# Patient Record
Sex: MALE | Race: ASIAN | Employment: FULL TIME | URBAN - METROPOLITAN AREA
[De-identification: names, ages, dates, MRNs, and addresses within clinical notes are randomized per-mention and may not be internally consistent; named-entity substitution may affect disease eponyms.]

---

## 2017-04-25 ENCOUNTER — TELEPHONE (OUTPATIENT)
Dept: INTERNAL MEDICINE CLINIC | Age: 22
End: 2017-04-25

## 2017-04-25 DIAGNOSIS — J45.909 UNCOMPLICATED ASTHMA, UNSPECIFIED ASTHMA SEVERITY: Primary | ICD-10-CM

## 2017-07-03 ENCOUNTER — OFFICE VISIT (OUTPATIENT)
Dept: INTERNAL MEDICINE CLINIC | Age: 22
End: 2017-07-03

## 2017-07-03 ENCOUNTER — HOSPITAL ENCOUNTER (OUTPATIENT)
Dept: OTHER | Age: 22
Discharge: HOME | End: 2017-07-03
Attending: INTERNAL MEDICINE | Admitting: INTERNAL MEDICINE

## 2017-07-03 VITALS
OXYGEN SATURATION: 90 % | WEIGHT: 118 LBS | SYSTOLIC BLOOD PRESSURE: 102 MMHG | BODY MASS INDEX: 15.98 KG/M2 | DIASTOLIC BLOOD PRESSURE: 78 MMHG | HEART RATE: 96 BPM | RESPIRATION RATE: 16 BRPM | HEIGHT: 72 IN

## 2017-07-03 DIAGNOSIS — R06.2 WHEEZING: Primary | ICD-10-CM

## 2017-07-03 DIAGNOSIS — R06.2 WHEEZING: ICD-10-CM

## 2017-07-03 PROCEDURE — 94640 AIRWAY INHALATION TREATMENT: CPT | Performed by: INTERNAL MEDICINE

## 2017-07-03 PROCEDURE — 99213 OFFICE O/P EST LOW 20 MIN: CPT | Performed by: INTERNAL MEDICINE

## 2017-07-03 RX ORDER — PREDNISONE 20 MG/1
TABLET ORAL
Qty: 15 TABLET | Refills: 0 | Status: SHIPPED | OUTPATIENT
Start: 2017-07-03 | End: 2018-01-18 | Stop reason: ALTCHOICE

## 2017-07-03 RX ORDER — AZITHROMYCIN 250 MG/1
TABLET, FILM COATED ORAL
Qty: 1 PACKET | Refills: 0 | Status: SHIPPED | OUTPATIENT
Start: 2017-07-03 | End: 2017-07-13

## 2017-07-03 RX ORDER — ALBUTEROL SULFATE 2.5 MG/3ML
2.5 SOLUTION RESPIRATORY (INHALATION) ONCE
Status: COMPLETED | OUTPATIENT
Start: 2017-07-03 | End: 2017-07-03

## 2017-07-03 RX ADMIN — ALBUTEROL SULFATE 2.5 MG: 2.5 SOLUTION RESPIRATORY (INHALATION) at 10:28

## 2017-07-03 ASSESSMENT — ENCOUNTER SYMPTOMS
COUGH: 1
SINUS PRESSURE: 0
RHINORRHEA: 0
SHORTNESS OF BREATH: 1

## 2017-07-06 ENCOUNTER — TELEPHONE (OUTPATIENT)
Dept: INTERNAL MEDICINE CLINIC | Age: 22
End: 2017-07-06

## 2017-07-10 ENCOUNTER — TELEPHONE (OUTPATIENT)
Dept: INTERNAL MEDICINE CLINIC | Age: 22
End: 2017-07-10

## 2017-07-10 DIAGNOSIS — J45.909 UNCOMPLICATED ASTHMA, UNSPECIFIED ASTHMA SEVERITY: ICD-10-CM

## 2017-07-10 RX ORDER — ALBUTEROL SULFATE 90 UG/1
2 AEROSOL, METERED RESPIRATORY (INHALATION) EVERY 6 HOURS PRN
Qty: 1 INHALER | Refills: 5 | Status: SHIPPED | OUTPATIENT
Start: 2017-07-10 | End: 2018-01-19 | Stop reason: ALTCHOICE

## 2017-07-31 ENCOUNTER — TELEPHONE (OUTPATIENT)
Dept: PHARMACY | Facility: CLINIC | Age: 22
End: 2017-07-31

## 2017-11-27 ENCOUNTER — OFFICE VISIT (OUTPATIENT)
Dept: URGENT CARE | Age: 22
End: 2017-11-27

## 2017-11-27 VITALS
OXYGEN SATURATION: 97 % | HEIGHT: 72 IN | HEART RATE: 70 BPM | WEIGHT: 135 LBS | SYSTOLIC BLOOD PRESSURE: 116 MMHG | TEMPERATURE: 97.1 F | DIASTOLIC BLOOD PRESSURE: 69 MMHG | BODY MASS INDEX: 18.28 KG/M2

## 2017-11-27 DIAGNOSIS — J06.9 VIRAL UPPER RESPIRATORY TRACT INFECTION: Primary | ICD-10-CM

## 2017-11-27 PROCEDURE — 99202 OFFICE O/P NEW SF 15 MIN: CPT | Performed by: PHYSICIAN ASSISTANT

## 2017-11-27 ASSESSMENT — ENCOUNTER SYMPTOMS
SPUTUM PRODUCTION: 0
WHEEZING: 0
SORE THROAT: 0
SINUS PAIN: 0
COUGH: 0
NAUSEA: 0
VOMITING: 0
DIARRHEA: 0
STRIDOR: 0
ABDOMINAL PAIN: 0
SHORTNESS OF BREATH: 0

## 2017-11-27 NOTE — PROGRESS NOTES
Reason for Visit:   Chief Complaint   Patient presents with    Head Congestion     Onset:  one week; nasal congestion, coughing, no fever     Patient History     HPI: Ashlie Glover is a 25 y.o. male who presents with nasal congestion/runny nose for one week. He denies any fever, sore throat, ear pain, chest congestion, cough, or wheeze. He has a hx of environmental allergies (not currently taking any meds for this) and asthma. Past Medical History:   Diagnosis Date    Anaphylactic reaction     due to nut allergy    Asthma        History reviewed. No pertinent surgical history. Allergies: Allergies   Allergen Reactions    Peanut-Containing Drug Products      anaphylaxis    Tree Nut [Macadamia Nut Oil]        Review of Systems     ROS: Please refer to HPI for any pertinent positive findings, as all other systems were reviewed as negative unless otherwise listed above. Review of Systems   Constitutional: Negative for chills, diaphoresis, fever and malaise/fatigue. HENT: Positive for congestion. Negative for ear pain, sinus pain and sore throat. Respiratory: Negative for cough, sputum production, shortness of breath, wheezing and stridor. Gastrointestinal: Negative for abdominal pain, diarrhea, nausea and vomiting. Musculoskeletal: Negative for myalgias. Skin: Negative for rash. Endo/Heme/Allergies: Positive for environmental allergies. Physical Exam     Vitals:    11/27/17 1024   BP: 116/69   Pulse: 70   Temp: 97.1 °F (36.2 °C)   SpO2: 97%       Constitutional:  Well developed, well nourished, no acute distress, non-toxic appearance   Eyes:  PERRL, conjunctiva normal, no ciliary injection, evidence of foreign body, or discharge. HENT:  Head is normocephalic, atraumatic; external ears and TMs normal bilaterally, nasal mucosa congestion with clear drainage, oropharynx pink and moist, no pharyngeal exudates.  Neck- Supple, passive and active range of motion in tact, no tenderness upon palpation along spine. No LAD  or neck masses appreciated. Respiratory:  No respiratory distress, normal breath sounds bilaterally, no rales, no wheezing. Cardiovascular:  Normal rate, normal rhythm, no murmurs, no gallops, no rubs   GI:  Abdomen soft, nontender, nondistended, normal bowel sounds, no organomegaly, no mass, no rebound, no guarding. Musculoskeletal:  No pain upon palpation along spine or musculature. No edema, no tenderness, no deformities. Integument:  Well hydrated, no lesions, cyanosis, or rashes appreciated. Lymphatic:  No lymphadenopathy noted   Neurologic:  Alert & oriented x 3, CN 2-12 in tact bilaterally, normal motor function and sensation in tact, no focal deficits noted   Psychiatric:  Speech and behavior appropriate. Appropriate mood and affect. Physical Exam    Procedure:   none    Assessment:    1. Viral upper respiratory tract infection        Plan:    - We have discussed humidification, fluids, and nasal saline. Encouraged Flonase BID for the next 1-2 weeks and/or Sudafed +/- antihistamine. Take Tylenol/Ibuprofen as needed. Follow up with PCP in the next 3-5 days if sxs worsen and/or fever occurs. No orders of the defined types were placed in this encounter.

## 2017-12-20 ENCOUNTER — NURSE TRIAGE (OUTPATIENT)
Dept: OTHER | Facility: CLINIC | Age: 22
End: 2017-12-20

## 2017-12-20 NOTE — TELEPHONE ENCOUNTER
Late Entry for Triage encounter called to [facility]. Please Refer to  for call information and disposition.

## 2018-01-18 ENCOUNTER — OFFICE VISIT (OUTPATIENT)
Dept: ORTHOPEDIC SURGERY | Age: 23
End: 2018-01-18

## 2018-01-18 ENCOUNTER — PAT TELEPHONE (OUTPATIENT)
Dept: PREADMISSION TESTING | Age: 23
End: 2018-01-18

## 2018-01-18 ENCOUNTER — OFFICE VISIT (OUTPATIENT)
Dept: PULMONOLOGY | Age: 23
End: 2018-01-18

## 2018-01-18 VITALS
BODY MASS INDEX: 18.01 KG/M2 | HEART RATE: 86 BPM | HEIGHT: 72 IN | WEIGHT: 133 LBS | RESPIRATION RATE: 16 BRPM | SYSTOLIC BLOOD PRESSURE: 109 MMHG | OXYGEN SATURATION: 91 % | TEMPERATURE: 97.1 F | DIASTOLIC BLOOD PRESSURE: 73 MMHG

## 2018-01-18 VITALS
DIASTOLIC BLOOD PRESSURE: 70 MMHG | HEIGHT: 72 IN | SYSTOLIC BLOOD PRESSURE: 110 MMHG | HEART RATE: 62 BPM | BODY MASS INDEX: 18.01 KG/M2 | WEIGHT: 133 LBS

## 2018-01-18 DIAGNOSIS — D72.10 EOSINOPHILIA: ICD-10-CM

## 2018-01-18 DIAGNOSIS — J45.40 MODERATE PERSISTENT ASTHMA WITHOUT COMPLICATION: ICD-10-CM

## 2018-01-18 DIAGNOSIS — Z23 NEED FOR INFLUENZA VACCINATION: ICD-10-CM

## 2018-01-18 DIAGNOSIS — Z72.0 TOBACCO ABUSE: ICD-10-CM

## 2018-01-18 DIAGNOSIS — S66.821A: Primary | ICD-10-CM

## 2018-01-18 DIAGNOSIS — J45.40 MODERATE PERSISTENT ASTHMA WITHOUT COMPLICATION: Primary | ICD-10-CM

## 2018-01-18 DIAGNOSIS — Z23 NEED FOR VACCINATION FOR STREP PNEUMONIAE: ICD-10-CM

## 2018-01-18 LAB
BASOPHILS ABSOLUTE: 0.3 K/UL (ref 0–0.2)
BASOPHILS RELATIVE PERCENT: 2 %
EOSINOPHILS ABSOLUTE: 2.1 K/UL (ref 0–0.6)
EOSINOPHILS RELATIVE PERCENT: 15 %
HCT VFR BLD CALC: 52.9 % (ref 40.5–52.5)
HEMATOLOGY PATH CONSULT: YES
HEMOGLOBIN: 18 G/DL (ref 13.5–17.5)
LYMPHOCYTES ABSOLUTE: 2 K/UL (ref 1–5.1)
LYMPHOCYTES RELATIVE PERCENT: 14 %
MCH RBC QN AUTO: 33.2 PG (ref 26–34)
MCHC RBC AUTO-ENTMCNC: 33.9 G/DL (ref 31–36)
MCV RBC AUTO: 97.8 FL (ref 80–100)
MONOCYTES ABSOLUTE: 0.7 K/UL (ref 0–1.3)
MONOCYTES RELATIVE PERCENT: 5 %
MYELOCYTE PERCENT: 1 %
NEUTROPHILS ABSOLUTE: 9.2 K/UL (ref 1.7–7.7)
NEUTROPHILS RELATIVE PERCENT: 63 %
PDW BLD-RTO: 13.7 % (ref 12.4–15.4)
PLATELET # BLD: 254 K/UL (ref 135–450)
PLATELET SLIDE REVIEW: ADEQUATE
PMV BLD AUTO: 8 FL (ref 5–10.5)
RBC # BLD: 5.41 M/UL (ref 4.2–5.9)
SLIDE REVIEW: ABNORMAL
WBC # BLD: 14.3 K/UL (ref 4–11)

## 2018-01-18 PROCEDURE — 99243 OFF/OP CNSLTJ NEW/EST LOW 30: CPT | Performed by: ORTHOPAEDIC SURGERY

## 2018-01-18 PROCEDURE — 90472 IMMUNIZATION ADMIN EACH ADD: CPT | Performed by: INTERNAL MEDICINE

## 2018-01-18 PROCEDURE — 90732 PPSV23 VACC 2 YRS+ SUBQ/IM: CPT | Performed by: INTERNAL MEDICINE

## 2018-01-18 PROCEDURE — 99244 OFF/OP CNSLTJ NEW/EST MOD 40: CPT | Performed by: INTERNAL MEDICINE

## 2018-01-18 PROCEDURE — 90471 IMMUNIZATION ADMIN: CPT | Performed by: INTERNAL MEDICINE

## 2018-01-18 PROCEDURE — 90686 IIV4 VACC NO PRSV 0.5 ML IM: CPT | Performed by: INTERNAL MEDICINE

## 2018-01-18 RX ORDER — MONTELUKAST SODIUM 10 MG/1
10 TABLET ORAL NIGHTLY
Qty: 30 TABLET | Refills: 6 | Status: SHIPPED | OUTPATIENT
Start: 2018-01-18 | End: 2020-01-29 | Stop reason: SDUPTHER

## 2018-01-18 RX ORDER — ALBUTEROL SULFATE 90 UG/1
2 AEROSOL, METERED RESPIRATORY (INHALATION) EVERY 4 HOURS PRN
Qty: 1 INHALER | Refills: 11 | Status: SHIPPED | OUTPATIENT
Start: 2018-01-18

## 2018-01-18 NOTE — LETTER
[facility] and [address] [facility]  Phone: [phone]  Fax: [phone]    Geetha Meier DO        January 18, 2018     Patient: Ortiz Leija   YOB: 1995   Date of Visit: 1/18/2018       To Whom it May Concern:    Ortiz Leija was seen in my clinic on 1/18/2018. He may return to school on 1/19/18. If you have any questions or concerns, please don't hesitate to call.     Sincerely,         Geetha Meier DO

## 2018-01-18 NOTE — PROGRESS NOTES
This 25 y.o. right hand dominant  is seen in consultation for Corrina Garza MD with a chief complaint of an injury to the right little finger. The injury occurred 6 days ago when the patient lacerated the finger with a knife. He also had a smaller, more superficial laceration of the adjacent ring finger. He noticed pain, bleeding. The patient was evaluated at the [facility], [place], operated by [facility] DR [facility]LO [facility] 3 days later, where the wounds were cleaned,dressed and splinted  A lacerated tendon was suspected. A lacerated nerve was not suspected. A foreign body was not found. Antibiotics were not prescribed. Tetanus prophylaxis was updated. The patient was sent for hand surgery consultation. The pain assessment has been reviewed and is correct. The patient's social history, past medical history, family history, medications, allergies and review of systems, entered 1/18/18, have been reviewed, and dated and are recorded in the chart. On physical examination the patient is Height: 6' (182.9 cm) tall and weighs Weight: 133 lb (60.3 kg). Respirations are 18 per minute. The patient is well nourished, is oriented to time and place, demonstrates appropriate mood and affect as well as normal gait and station. There is a 1 cm transverse laceration involving the palmar aspect of the right little finger at a level 1 CM distal to the proximal flexor crease. There is absent drainage. There is absent sign of infection. Range of motion of the little finger only is absent in active flexion at the PIP and DIP joints. Distal sensation  is normal.  Distal circulation is intact. There is no deformity. All joints are stable. Deep tendon reflexes are present bilaterally. There is no cellulitis or lymphangitis. There is no proximal adenopathy. There is no sign of additional significant injury to the opposite upper extremity.     Review of outside Xrays of the right hand demonstrate no bone injury or foreign

## 2018-01-18 NOTE — ADDENDUM NOTE
Encounter addended by: Kael Ely MA on: 1/18/2018  2:27 PM<BR>    Actions taken: Letter status changed

## 2018-01-18 NOTE — PROGRESS NOTES
PATIENT IS SEEN AT THE REQUEST OF DR. Radha Louise for pneumonia    Chief complaint  This is a 25y.o. year old male  who comes to see me with a chief complaint of   Chief Complaint   Patient presents with    Pneumonia       HPI  Here with a cc of pneumonia and asthma. Joelle Noble he was at [facility] for pneumonia back in April. Since that time he has improved and feels that his breathing is doing better. He is using Advair bid and albuterol. Rarely has needed to use it lately. Does smoke cigarettes and drink ETOH(heavily) he downplayed those facts because his mother was present in the room. Has known asthma since childhood. Does not know her triggers, but thinks exercise and cold air are some. He is in school    Occupation:  Student, works at [employer]: None    Hobbies/Exposures: None    TB Exposure:  None    Lung Procedures:  Bronchoscopy 4/2017, Intubation 4/2017      Past Medical History:   Diagnosis Date    Anaphylactic reaction     due to nut allergy    Asthma        History reviewed. No pertinent surgical history. Social History     Social History    Marital status: Single     Spouse name: N/A    Number of children: N/A    Years of education: N/A     Occupational History    Not on file.      Social History Main Topics    Smoking status: Never Smoker    Smokeless tobacco: Never Used    Alcohol use 8.4 oz/week     14 Cans of beer per week    Drug use: No    Sexual activity: No     Other Topics Concern    Not on file     Social History Narrative    No narrative on file       Family History   Problem Relation Age of Onset    Heart Disease Father 48         Current Outpatient Prescriptions:     fluticasone-salmeterol (ADVAIR DISKUS) 250-50 MCG/DOSE AEPB, Inhale 1 puff into the lungs every 12 hours, Disp: 60 each, Rfl: 5    albuterol sulfate HFA (PROAIR HFA) 108 (90 Base) MCG/ACT inhaler, Inhale 2 puffs into the lungs every 6 hours as needed for Wheezing, Disp: 1 Inhaler, Rfl: 5    EPINEPHrine

## 2018-01-19 ENCOUNTER — PAT TELEPHONE (OUTPATIENT)
Dept: PREADMISSION TESTING | Age: 23
End: 2018-01-19

## 2018-01-19 ENCOUNTER — OFFICE VISIT (OUTPATIENT)
Dept: INTERNAL MEDICINE CLINIC | Age: 23
End: 2018-01-19

## 2018-01-19 VITALS — WEIGHT: 134 LBS | HEIGHT: 72 IN | BODY MASS INDEX: 18.15 KG/M2

## 2018-01-19 VITALS
TEMPERATURE: 98.4 F | RESPIRATION RATE: 12 BRPM | HEART RATE: 80 BPM | DIASTOLIC BLOOD PRESSURE: 72 MMHG | OXYGEN SATURATION: 91 % | SYSTOLIC BLOOD PRESSURE: 102 MMHG | BODY MASS INDEX: 18.18 KG/M2 | WEIGHT: 134.2 LBS | HEIGHT: 72 IN

## 2018-01-19 DIAGNOSIS — S66.821A: Primary | ICD-10-CM

## 2018-01-19 LAB — HEMATOLOGY PATH CONSULT: NORMAL

## 2018-01-19 PROCEDURE — 99214 OFFICE O/P EST MOD 30 MIN: CPT | Performed by: NURSE PRACTITIONER

## 2018-01-19 ASSESSMENT — ENCOUNTER SYMPTOMS
CHEST TIGHTNESS: 0
BACK PAIN: 0
DIARRHEA: 0
ABDOMINAL PAIN: 0
CONSTIPATION: 0
PHOTOPHOBIA: 0
SHORTNESS OF BREATH: 0
SORE THROAT: 0
COUGH: 0
TROUBLE SWALLOWING: 0
EYE PAIN: 0
WHEEZING: 0
NAUSEA: 0
VOMITING: 0

## 2018-01-19 NOTE — PROGRESS NOTES
wheezes. He has no rales. He exhibits no tenderness. Abdominal: Soft. Bowel sounds are normal. He exhibits no distension and no mass. There is no tenderness. There is no rebound and no guarding. Musculoskeletal: Normal range of motion. He exhibits no edema or tenderness. Lymphadenopathy:     He has no cervical adenopathy. Neurological: He is alert and oriented to person, place, and time. He has normal reflexes. Skin: Skin is warm and dry. Laceration (laceration noted to 4th/5th finger right hand, skin dry and intact, limited ROM, patient denies pain ) noted. No rash noted. No erythema. Psychiatric: He has a normal mood and affect. His behavior is normal.        Assessment/PLAN    There are no diagnoses linked to this encounter. Laceration right little finger w/complete lacerations of both flexor tendons. -patient scheduled to have OP procedure Dr. Vivienne Montalvo 01/23/2018    Asthma, patient reports exercise induced  -recently started on singular, f/w Pulm as Op, no acute distress at this time     GOLDMANS RISK stratification for non-cardiac surgery  - <1%    Patient is able to proceed with medical procedure from PCP standpoint, per surgical recs. Plan:   No problem-specific Assessment & Plan notes found for this encounter. No Follow-up on file.

## 2018-01-19 NOTE — PRE-PROCEDURE INSTRUCTIONS
C-Difficile admission screening and protocol:     * Admitted with diarrhea?no   *Prior history of C-Diff. In last 3 months? no     *Antibiotic use in the past 6-8 weeks? no     *Prior hospitalization or nursing home in the last month?   no

## 2018-01-20 LAB
ALLERGEN ASPERGILLUS ALTERNATA IGE: 19 KU/L
ALLERGEN ASPERGILLUS FUMIGATUS IGE: 0.47 KU/L
ALLERGEN BERMUDA GRASS IGE: 0.56 KU/L
ALLERGEN BIRCH IGE: 0.45 KU/L
ALLERGEN CAT DANDER IGE: 49.6 KU/L
ALLERGEN COMMON SHORT RAGWEED IGE: 0.37 KU/L
ALLERGEN COTTONWOOD: 0.49 KU/L
ALLERGEN COW MILK IGE: 1.1 KU/L
ALLERGEN DOG DANDER IGE: 40.5 KU/L
ALLERGEN ELM IGE: 0.58 KU/L
ALLERGEN FUNGI/MOLD M.RACEMOSUS IGE: 1.92 KU/L
ALLERGEN GERMAN COCKROACH IGE: 4.28 KU/L
ALLERGEN HORMODENDRUM HORDEI IGE: 2.47 KU/L
ALLERGEN MAPLE/BOX ELDER IGE: 1.85 KU/L
ALLERGEN MITE DUST FARINAE IGE: 3.4 KU/L
ALLERGEN MITE DUST PTERONYSSINUS IGE: 3.31 KU/L
ALLERGEN MOUNTAIN CEDAR: 0.68 KU/L
ALLERGEN MOUSE EPITHELIA IGE: 0.41 KU/L
ALLERGEN OAK TREE IGE: 0.46 KU/L
ALLERGEN PEANUT (F13) IGE: >100 KU/L
ALLERGEN PECAN TREE IGE: 0.58 KU/L
ALLERGEN PENICILLIUM NOTATUM: 0.31 KU/L
ALLERGEN ROUGH PIGWEED (W14) IGE: 1.67 KU/L
ALLERGEN RUSSIAN THISTLE IGE: 0.5 KU/L
ALLERGEN SEE NOTE: NORMAL
ALLERGEN SHEEP SORREL (W18) IGE: 0.4 KU/L
ALLERGEN TIMOTHY GRASS: 0.58 KU/L
ALLERGEN TREE SYCAMORE: 0.59 KU/L
ALLERGEN WALNUT TREE IGE: 0.86 KU/L
ALLERGEN WHITE MULBERRY TREE, IGE: 0.41 KU/L
ALLERGEN, TREE, WHITE ASH IGE: 1.63 KU/L
IGE: 6989 KU/L

## 2018-01-23 ENCOUNTER — HOSPITAL ENCOUNTER (OUTPATIENT)
Dept: SURGERY | Age: 23
Discharge: HOME | End: 2018-01-23
Attending: ORTHOPAEDIC SURGERY | Admitting: ORTHOPAEDIC SURGERY

## 2018-01-23 VITALS
DIASTOLIC BLOOD PRESSURE: 88 MMHG | WEIGHT: 127.87 LBS | BODY MASS INDEX: 17.32 KG/M2 | RESPIRATION RATE: 16 BRPM | HEART RATE: 86 BPM | TEMPERATURE: 97.6 F | SYSTOLIC BLOOD PRESSURE: 135 MMHG | OXYGEN SATURATION: 99 % | HEIGHT: 72 IN

## 2018-01-23 DIAGNOSIS — S66.821A: Primary | ICD-10-CM

## 2018-01-23 RX ORDER — ONDANSETRON 2 MG/ML
4 INJECTION INTRAMUSCULAR; INTRAVENOUS
Status: ACTIVE | OUTPATIENT
Start: 2018-01-23 | End: 2018-01-23

## 2018-01-23 RX ORDER — MEPERIDINE HYDROCHLORIDE 25 MG/ML
12.5 INJECTION INTRAMUSCULAR; INTRAVENOUS; SUBCUTANEOUS EVERY 5 MIN PRN
Status: DISCONTINUED | OUTPATIENT
Start: 2018-01-23 | End: 2018-01-24 | Stop reason: HOSPADM

## 2018-01-23 RX ORDER — MORPHINE SULFATE 2 MG/ML
1 INJECTION, SOLUTION INTRAMUSCULAR; INTRAVENOUS EVERY 5 MIN PRN
Status: DISCONTINUED | OUTPATIENT
Start: 2018-01-23 | End: 2018-01-24 | Stop reason: HOSPADM

## 2018-01-23 RX ORDER — HYDROCODONE BITARTRATE AND ACETAMINOPHEN 5; 325 MG/1; MG/1
1 TABLET ORAL EVERY 6 HOURS PRN
Qty: 28 TABLET | Refills: 0 | Status: SHIPPED | OUTPATIENT
Start: 2018-01-23 | End: 2018-01-30

## 2018-01-23 RX ORDER — SODIUM CHLORIDE 9 MG/ML
INJECTION, SOLUTION INTRAVENOUS CONTINUOUS
Status: DISCONTINUED | OUTPATIENT
Start: 2018-01-23 | End: 2018-01-24 | Stop reason: HOSPADM

## 2018-01-23 RX ORDER — OXYCODONE HYDROCHLORIDE 5 MG/1
5 TABLET ORAL PRN
Status: ACTIVE | OUTPATIENT
Start: 2018-01-23 | End: 2018-01-23

## 2018-01-23 RX ORDER — SODIUM CHLORIDE 0.9 % (FLUSH) 0.9 %
10 SYRINGE (ML) INJECTION EVERY 12 HOURS SCHEDULED
Status: DISCONTINUED | OUTPATIENT
Start: 2018-01-23 | End: 2018-01-24 | Stop reason: HOSPADM

## 2018-01-23 RX ORDER — MORPHINE SULFATE 2 MG/ML
2 INJECTION, SOLUTION INTRAMUSCULAR; INTRAVENOUS EVERY 5 MIN PRN
Status: DISCONTINUED | OUTPATIENT
Start: 2018-01-23 | End: 2018-01-24 | Stop reason: HOSPADM

## 2018-01-23 RX ORDER — FENTANYL CITRATE 50 UG/ML
25 INJECTION, SOLUTION INTRAMUSCULAR; INTRAVENOUS EVERY 5 MIN PRN
Status: DISCONTINUED | OUTPATIENT
Start: 2018-01-23 | End: 2018-01-24 | Stop reason: HOSPADM

## 2018-01-23 RX ORDER — FENTANYL CITRATE 50 UG/ML
50 INJECTION, SOLUTION INTRAMUSCULAR; INTRAVENOUS EVERY 5 MIN PRN
Status: DISCONTINUED | OUTPATIENT
Start: 2018-01-23 | End: 2018-01-24 | Stop reason: HOSPADM

## 2018-01-23 RX ORDER — OXYCODONE HYDROCHLORIDE 5 MG/1
10 TABLET ORAL PRN
Status: ACTIVE | OUTPATIENT
Start: 2018-01-23 | End: 2018-01-23

## 2018-01-23 RX ORDER — SODIUM CHLORIDE 0.9 % (FLUSH) 0.9 %
10 SYRINGE (ML) INJECTION PRN
Status: DISCONTINUED | OUTPATIENT
Start: 2018-01-23 | End: 2018-01-24 | Stop reason: HOSPADM

## 2018-01-23 RX ADMIN — SODIUM CHLORIDE: 9 INJECTION, SOLUTION INTRAVENOUS at 07:56

## 2018-01-23 ASSESSMENT — PAIN DESCRIPTION - FREQUENCY: FREQUENCY: CONTINUOUS

## 2018-01-23 ASSESSMENT — PAIN SCALES - GENERAL
PAINLEVEL_OUTOF10: 0
PAINLEVEL_OUTOF10: 0
PAINLEVEL_OUTOF10: 2
PAINLEVEL_OUTOF10: 0

## 2018-01-23 ASSESSMENT — ENCOUNTER SYMPTOMS: SHORTNESS OF BREATH: 0

## 2018-01-23 ASSESSMENT — PAIN DESCRIPTION - PAIN TYPE: TYPE: SURGICAL PAIN

## 2018-01-23 ASSESSMENT — PAIN DESCRIPTION - ONSET: ONSET: GRADUAL

## 2018-01-23 ASSESSMENT — PAIN - FUNCTIONAL ASSESSMENT: PAIN_FUNCTIONAL_ASSESSMENT: 0-10

## 2018-01-23 ASSESSMENT — PAIN DESCRIPTION - DESCRIPTORS: DESCRIPTORS: SORE

## 2018-01-23 ASSESSMENT — PAIN DESCRIPTION - PROGRESSION: CLINICAL_PROGRESSION: GRADUALLY WORSENING

## 2018-01-23 ASSESSMENT — PAIN DESCRIPTION - ORIENTATION: ORIENTATION: RIGHT

## 2018-01-23 ASSESSMENT — PAIN DESCRIPTION - LOCATION: LOCATION: HAND

## 2018-01-23 ASSESSMENT — LIFESTYLE VARIABLES: SMOKING_STATUS: 0

## 2018-01-23 NOTE — OP NOTE
be traumatically lacerated. The underlying flexor tendons were inspected and the Flexor Digitorum Superficialis (FDS) tendon was identified and was found to be partially lacerated, the Flexor Digitorum Profundus (FDP) tendon was identified and was found to be completely lacerated. The Radial Digital Nerve was inspected and found to be not lacerated,  the Ulnar Digital Nerve was inspected and found to be not lacerated. The Small Finger Flexor Digitorum Superficialis (FDS) was able to be retrieved at the level of the level of the laceration and was delivered through the flexor sheath to the level of the traumatic laceration. With distal flexion of the injured digit, the matching ends of the Flexor Digitorum Superficialis (FDS) were able to be approximated. The Flexor Digitorum Superficialis (FDS) was repaired with a 4 core strand repair of 4-0 Fiberwire using a locking Krackow suture technique. The repair was secure without gapping or bunching at the repair site. The finger was able to be extended to a composite 10 degrees without sign of distraction of the repair. The tendons were able to glide through the remaining flexor sheath without impingement or stenosis of the repair site. The wound was thoroughly irrigated and debrided of non-viable tissues. The Small Finger Flexor Digitorum Profundus (FDP) was able to be retrieved at the level of the A1 Pulley and was delivered through the flexor sheath to the level of the traumatic laceration. With distal flexion of the injured digit, the matching ends of the Flexor Digitorum Profundus (FDP) were able to be approximated. The Flexor Digitorum Profundus (FDP) was repaired with a 4 core strand repair of 4-0 Fiberwire using a locking Krackow suture technique. The repair was then oversewn with a running suture of 6-0 nylon. The repair was secure without gapping or bunching at the repair site.   The finger was able to be extended to a composite 10

## 2018-01-23 NOTE — H&P
Pre-operative Update of H&P:    I  have seen & examined Mr. Boothe Prom related solely to his hand and upper extremity conditions, prior to the scheduled procedure on the date of his surgery. The indications for the planned surgical procedure & and his upper-extremity conditionare unchanged. Please see the Anesthesia Pre-Op Note from date of surgery for Mr. Yazan Jones's systemic evaluation.

## 2018-01-23 NOTE — PROGRESS NOTES
To phase 2 via stretcher with RN assist,  Electronically signed by Beverly Cartagena RN on 1/23/2018 at 10:23 AM

## 2018-01-26 NOTE — ADDENDUM NOTE
Encounter addended by: Sebastian Katz MD on: 1/26/2018  8:03 AM<BR>    Actions taken: Letter status changed

## 2018-01-29 ENCOUNTER — OFFICE VISIT (OUTPATIENT)
Dept: ORTHOPEDIC SURGERY | Age: 23
End: 2018-01-29

## 2018-01-29 VITALS — BODY MASS INDEX: 17.2 KG/M2 | HEIGHT: 72 IN | WEIGHT: 127 LBS | RESPIRATION RATE: 15 BRPM

## 2018-01-29 DIAGNOSIS — S66.821A: Primary | ICD-10-CM

## 2018-01-29 PROCEDURE — 99024 POSTOP FOLLOW-UP VISIT: CPT | Performed by: ORTHOPAEDIC SURGERY

## 2018-01-29 NOTE — PATIENT INSTRUCTIONS
Thank you for choosing [facility]) Physicians for your Hand and Upper Extremity needs. If we can be of any further assistance to you, please do not hesitate to contact us.     Office Phone Number:  [phone]  or  [phone]

## 2018-01-29 NOTE — Clinical Note
Dear  Ricci Saint, MD,  Thank you very much for your referral or . Taylor Heller to me for evaluation and treatment of his Hand & Wrist condition. I appreciate your confidence in me and thank you for allowing me the opportunity to care for your patients. If I can be of any further assistance to you on this or any other patient, please do not hesitate to contact me. Sincerely,  Albino Innocent.  Bradley Devlin MD

## 2018-01-29 NOTE — PROGRESS NOTES
Mr. Gallito Tovar returns today in follow-up of his recent right Small Finger Flexor Digitorum Superficialis (FDS) and Flexor Digitorum Profundus (FDP) Tendon Repair which was done 1 week ago. He has noted decreased discomfort and decreased swelling. He has been started in Hand Therapy, is  wearing his protective splint. Physical Exam:  Skin incisions are not able to be assessed due to therapy bandage. Area around the bandage is clean and healthy. Digital range of motion is not assessed due to the presence of the fresh tendon repair. Wrist range of motion is not assessed due to the presence of the fresh tendon repair. Sensation is normal in the Whole Hand. Vascular examination reveals normal, good capillary refill and good color. Swelling is minimal.  Finger posture does suggest integrity of the tendon repair(s)  He does properly position & protect the hand while out of the splint and is  able to properly demonstrate his prescribed therapy techniques today. Impression:  Mr. Gallito Tovar is doing well after recent right Small Finger Flexor Digitorum Superficialis (FDS) and Flexor Digitorum Profundus (FDP) Tendon Repair. It would appear that he has not fully healed his repair. Plan:  Mr. Gallito Tovar is today returned to his appropriately applied protective splint. He is advised regarding the appropriate care of, wear, and use of this device. He is also instructed in continued strict adherance to his therapy protocols. These modalities were discussed with him today. We discussed the continued restrictions on the use of the injured hand & wrist and the limitations on resumption of activities.     I have asked Mr. Gallito Tovar to follow-up with me in approximately 3 weeks from now for re-evaluation out of splint/cast.      He is also specifically instructed to return to the office or call for an appointment sooner if his symptoms are changing or worsening prior to that time.

## 2018-01-31 NOTE — ADDENDUM NOTE
Encounter addended by: Elizabeth Estevez MA on: 1/31/2018  8:40 AM<BR>    Actions taken: Letter status changed

## 2018-02-26 ENCOUNTER — OFFICE VISIT (OUTPATIENT)
Dept: ORTHOPEDIC SURGERY | Age: 23
End: 2018-02-26

## 2018-02-26 VITALS — BODY MASS INDEX: 17.61 KG/M2 | HEIGHT: 72 IN | WEIGHT: 130 LBS | RESPIRATION RATE: 16 BRPM

## 2018-02-26 DIAGNOSIS — S66.821A: Primary | ICD-10-CM

## 2018-02-26 PROCEDURE — 99024 POSTOP FOLLOW-UP VISIT: CPT | Performed by: ORTHOPAEDIC SURGERY

## 2019-01-08 ENCOUNTER — NURSE ONLY (OUTPATIENT)
Dept: PRIMARY CARE CLINIC | Age: 24
End: 2019-01-08
Payer: COMMERCIAL

## 2019-01-08 DIAGNOSIS — Z23 NEED FOR INFLUENZA VACCINATION: Primary | ICD-10-CM

## 2019-01-08 PROCEDURE — 90688 IIV4 VACCINE SPLT 0.5 ML IM: CPT | Performed by: PHYSICIAN ASSISTANT

## 2019-01-08 PROCEDURE — 90471 IMMUNIZATION ADMIN: CPT | Performed by: PHYSICIAN ASSISTANT

## 2019-03-22 ENCOUNTER — OFFICE VISIT (OUTPATIENT)
Dept: PRIMARY CARE CLINIC | Age: 24
End: 2019-03-22
Payer: COMMERCIAL

## 2019-03-22 VITALS
BODY MASS INDEX: 18.28 KG/M2 | SYSTOLIC BLOOD PRESSURE: 132 MMHG | RESPIRATION RATE: 16 BRPM | OXYGEN SATURATION: 92 % | TEMPERATURE: 98.6 F | WEIGHT: 135 LBS | DIASTOLIC BLOOD PRESSURE: 76 MMHG | HEART RATE: 103 BPM | HEIGHT: 72 IN

## 2019-03-22 DIAGNOSIS — J45.21 EXACERBATION OF INTERMITTENT ASTHMA, UNSPECIFIED ASTHMA SEVERITY: Primary | ICD-10-CM

## 2019-03-22 PROCEDURE — 99214 OFFICE O/P EST MOD 30 MIN: CPT | Performed by: EMERGENCY MEDICINE

## 2019-03-22 RX ORDER — AZITHROMYCIN 250 MG/1
TABLET, FILM COATED ORAL
Qty: 1 PACKET | Refills: 0 | Status: SHIPPED | OUTPATIENT
Start: 2019-03-22 | End: 2019-04-01

## 2019-03-22 RX ORDER — AZITHROMYCIN 250 MG/1
TABLET, FILM COATED ORAL
Qty: 1 PACKET | Refills: 0 | Status: SHIPPED | OUTPATIENT
Start: 2019-03-22 | End: 2019-03-22 | Stop reason: SDUPTHER

## 2019-03-22 RX ORDER — PREDNISONE 10 MG/1
10 TABLET ORAL 2 TIMES DAILY
Qty: 10 TABLET | Refills: 0 | Status: SHIPPED | OUTPATIENT
Start: 2019-03-22 | End: 2019-03-27

## 2019-03-22 RX ORDER — PREDNISONE 10 MG/1
10 TABLET ORAL 2 TIMES DAILY
Qty: 10 TABLET | Refills: 0 | Status: SHIPPED | OUTPATIENT
Start: 2019-03-22 | End: 2019-03-22 | Stop reason: SDUPTHER

## 2019-03-22 ASSESSMENT — ENCOUNTER SYMPTOMS
WHEEZING: 1
COUGH: 1
RHINORRHEA: 0
EYE DISCHARGE: 0
VOMITING: 0
CHEST TIGHTNESS: 1
SHORTNESS OF BREATH: 1
EYE PAIN: 0
TROUBLE SWALLOWING: 0
SORE THROAT: 0
NAUSEA: 0
ABDOMINAL PAIN: 0
HEMOPTYSIS: 0

## 2019-08-14 DIAGNOSIS — J45.40 MODERATE PERSISTENT ASTHMA WITHOUT COMPLICATION: ICD-10-CM

## 2019-08-16 ENCOUNTER — TELEPHONE (OUTPATIENT)
Dept: PULMONOLOGY | Age: 24
End: 2019-08-16

## 2019-08-16 NOTE — TELEPHONE ENCOUNTER
Dr Nuzhat Gonzales, this is elvira's pt, he scheduled an appt and has been out of med, will you fill a 30 day supply to get him to appt?

## 2019-08-16 NOTE — TELEPHONE ENCOUNTER
Patients mother calling in for advair refill for son that was sent in 2 days ago.     Please advise once completed

## 2019-08-16 NOTE — TELEPHONE ENCOUNTER
Mom called again let her know that he has not been in a year and a half and at this point he may just have to wait until his appt and recommended she contact the pcp and she stated they need him to be seen as well, apologized but he has not shown up for appt in over a year and a half, she voiced understanding  Has an appt next thursday

## 2019-08-22 ENCOUNTER — OFFICE VISIT (OUTPATIENT)
Dept: PULMONOLOGY | Age: 24
End: 2019-08-22
Payer: COMMERCIAL

## 2019-08-22 VITALS
BODY MASS INDEX: 18.28 KG/M2 | HEIGHT: 72 IN | RESPIRATION RATE: 16 BRPM | TEMPERATURE: 97.2 F | OXYGEN SATURATION: 99 % | HEART RATE: 56 BPM | SYSTOLIC BLOOD PRESSURE: 122 MMHG | WEIGHT: 135 LBS | DIASTOLIC BLOOD PRESSURE: 71 MMHG

## 2019-08-22 DIAGNOSIS — J82.81 CHRONIC EOSINOPHILIC PNEUMONIA (HCC): ICD-10-CM

## 2019-08-22 DIAGNOSIS — J45.40 MODERATE PERSISTENT ASTHMA WITHOUT COMPLICATION: ICD-10-CM

## 2019-08-22 DIAGNOSIS — R76.8 ELEVATED IGE LEVEL: ICD-10-CM

## 2019-08-22 DIAGNOSIS — J45.40 MODERATE PERSISTENT ASTHMA WITHOUT COMPLICATION: Primary | ICD-10-CM

## 2019-08-22 LAB
BASOPHILS ABSOLUTE: 0.1 K/UL (ref 0–0.2)
BASOPHILS RELATIVE PERCENT: 1 %
EOSINOPHILS ABSOLUTE: 0.5 K/UL (ref 0–0.6)
EOSINOPHILS RELATIVE PERCENT: 6.2 %
HCT VFR BLD CALC: 46 % (ref 40.5–52.5)
HEMOGLOBIN: 15.6 G/DL (ref 13.5–17.5)
LYMPHOCYTES ABSOLUTE: 2.2 K/UL (ref 1–5.1)
LYMPHOCYTES RELATIVE PERCENT: 24.6 %
MCH RBC QN AUTO: 32.9 PG (ref 26–34)
MCHC RBC AUTO-ENTMCNC: 33.9 G/DL (ref 31–36)
MCV RBC AUTO: 97.3 FL (ref 80–100)
MONOCYTES ABSOLUTE: 0.8 K/UL (ref 0–1.3)
MONOCYTES RELATIVE PERCENT: 8.9 %
NEUTROPHILS ABSOLUTE: 5.2 K/UL (ref 1.7–7.7)
NEUTROPHILS RELATIVE PERCENT: 59.3 %
PDW BLD-RTO: 12.2 % (ref 12.4–15.4)
PLATELET # BLD: 211 K/UL (ref 135–450)
PMV BLD AUTO: 7.9 FL (ref 5–10.5)
RBC # BLD: 4.73 M/UL (ref 4.2–5.9)
WBC # BLD: 8.8 K/UL (ref 4–11)

## 2019-08-22 PROCEDURE — 99214 OFFICE O/P EST MOD 30 MIN: CPT | Performed by: INTERNAL MEDICINE

## 2019-08-22 RX ORDER — MONTELUKAST SODIUM 10 MG/1
10 TABLET ORAL DAILY
Qty: 90 TABLET | Refills: 1 | Status: SHIPPED | OUTPATIENT
Start: 2019-08-22 | End: 2020-01-29 | Stop reason: SDUPTHER

## 2019-08-22 ASSESSMENT — ASTHMA QUESTIONNAIRES
QUESTION_2 LAST FOUR WEEKS HOW OFTEN HAVE YOU HAD SHORTNESS OF BREATH: 4
ACT_TOTALSCORE: 22
QUESTION_1 LAST FOUR WEEKS HOW MUCH OF THE TIME DID YOUR ASTHMA KEEP YOU FROM GETTING AS MUCH DONE AT WORK, SCHOOL OR AT HOME: 5
QUESTION_5 LAST FOUR WEEKS HOW WOULD YOU RATE YOUR ASTHMA CONTROL: 5
QUESTION_3 LAST FOUR WEEKS HOW OFTEN DID YOUR ASTHMA SYMPTOMS (WHEEZING, COUGHING, SHORTNESS OF BREATH, CHEST TIGHTNESS OR PAIN) WAKE YOU UP AT NIGHT OR EARLIER THAN USUAL IN THE MORNING: 4
QUESTION_4 LAST FOUR WEEKS HOW OFTEN HAVE YOU USED YOUR RESCUE INHALER OR NEBULIZER MEDICATION (SUCH AS ALBUTEROL): 4

## 2019-08-22 NOTE — PROGRESS NOTES
together: Not on file     Attends Yarsanism service: Not on file     Active member of club or organization: Not on file     Attends meetings of clubs or organizations: Not on file     Relationship status: Not on file    Intimate partner violence:     Fear of current or ex partner: Not on file     Emotionally abused: Not on file     Physically abused: Not on file     Forced sexual activity: Not on file   Other Topics Concern    Not on file   Social History Narrative    Not on file       Family History   Problem Relation Age of Onset    Heart Disease Father 48         Current Outpatient Medications:     ADVAIR DISKUS 500-50 MCG/DOSE diskus inhaler, INHALE ONE PUFF BY MOUTH EVERY 12 HOURS, Disp: 1 Inhaler, Rfl: 0    montelukast (SINGULAIR) 10 MG tablet, Take 1 tablet by mouth nightly, Disp: 30 tablet, Rfl: 6    albuterol sulfate HFA (PROAIR HFA) 108 (90 Base) MCG/ACT inhaler, Inhale 2 puffs into the lungs every 4 hours as needed for Wheezing or Shortness of Breath, Disp: 1 Inhaler, Rfl: 11    EPINEPHrine (EPIPEN 2-ADIEL) 0.3 MG/0.3ML SOAJ injection, Inject 0.3 mLs into the muscle once for 1 dose Use as directed for allergic reaction, Disp: 0.3 mL, Rfl: 5      ROS:  GENERAL:  No fevers, chills, or night sweats  HEENT:  No double vision, blurry vision, or difficulty swallowing  HEART:  No palpitations, no chest pains   LUNGS:  Breathing is controlled  ABDOMEN:  No abdominal pains, no changes in stools  GENITOURINARY:  No increased frequency, no blood in urine  EXTREMITIES:  No swelling, no lesions  NEURO:  No numbness or tingling, no seizures  SKIN:  No new rashes, no changes in hair or nails  LYMPH:  No masses, no swelling neck, armpits, or groin    PHYSICAL EXAM:  Vitals:    08/22/19 0942   BP: 122/71   Pulse: 56   Resp: 16   Temp: 97.2 °F (36.2 °C)   SpO2: 99%       GENERAL:  Well nourished, alert, appears stated age, no distress  HEENT:  No scleral icterus, no conjunctival irritation  NECK:  No thyromegaly, no

## 2019-08-24 LAB — ANCA IFA: NORMAL

## 2019-10-03 DIAGNOSIS — J45.40 MODERATE PERSISTENT ASTHMA WITHOUT COMPLICATION: ICD-10-CM

## 2020-01-29 ENCOUNTER — OFFICE VISIT (OUTPATIENT)
Dept: PULMONOLOGY | Age: 25
End: 2020-01-29
Payer: COMMERCIAL

## 2020-01-29 VITALS
HEIGHT: 72 IN | HEART RATE: 80 BPM | DIASTOLIC BLOOD PRESSURE: 60 MMHG | SYSTOLIC BLOOD PRESSURE: 122 MMHG | RESPIRATION RATE: 14 BRPM | BODY MASS INDEX: 17.88 KG/M2 | OXYGEN SATURATION: 99 % | WEIGHT: 132 LBS

## 2020-01-29 PROCEDURE — 90471 IMMUNIZATION ADMIN: CPT | Performed by: INTERNAL MEDICINE

## 2020-01-29 PROCEDURE — 99213 OFFICE O/P EST LOW 20 MIN: CPT | Performed by: INTERNAL MEDICINE

## 2020-01-29 PROCEDURE — 90686 IIV4 VACC NO PRSV 0.5 ML IM: CPT | Performed by: INTERNAL MEDICINE

## 2020-01-29 RX ORDER — MONTELUKAST SODIUM 10 MG/1
10 TABLET ORAL NIGHTLY
Qty: 90 TABLET | Refills: 1 | Status: SHIPPED | OUTPATIENT
Start: 2020-01-29 | End: 2020-05-20 | Stop reason: SDUPTHER

## 2020-01-29 ASSESSMENT — ASTHMA QUESTIONNAIRES
ACT_TOTALSCORE: 22
QUESTION_1 LAST FOUR WEEKS HOW MUCH OF THE TIME DID YOUR ASTHMA KEEP YOU FROM GETTING AS MUCH DONE AT WORK, SCHOOL OR AT HOME: 5
QUESTION_3 LAST FOUR WEEKS HOW OFTEN DID YOUR ASTHMA SYMPTOMS (WHEEZING, COUGHING, SHORTNESS OF BREATH, CHEST TIGHTNESS OR PAIN) WAKE YOU UP AT NIGHT OR EARLIER THAN USUAL IN THE MORNING: 5
QUESTION_4 LAST FOUR WEEKS HOW OFTEN HAVE YOU USED YOUR RESCUE INHALER OR NEBULIZER MEDICATION (SUCH AS ALBUTEROL): 4
QUESTION_2 LAST FOUR WEEKS HOW OFTEN HAVE YOU HAD SHORTNESS OF BREATH: 4
QUESTION_5 LAST FOUR WEEKS HOW WOULD YOU RATE YOUR ASTHMA CONTROL: 4

## 2020-02-03 ENCOUNTER — OFFICE VISIT (OUTPATIENT)
Dept: PRIMARY CARE CLINIC | Age: 25
End: 2020-02-03
Payer: COMMERCIAL

## 2020-02-03 VITALS
HEIGHT: 72 IN | RESPIRATION RATE: 14 BRPM | TEMPERATURE: 98.1 F | BODY MASS INDEX: 18.28 KG/M2 | HEART RATE: 83 BPM | OXYGEN SATURATION: 92 % | WEIGHT: 135 LBS | DIASTOLIC BLOOD PRESSURE: 80 MMHG | SYSTOLIC BLOOD PRESSURE: 117 MMHG

## 2020-02-03 PROCEDURE — 99213 OFFICE O/P EST LOW 20 MIN: CPT | Performed by: PHYSICIAN ASSISTANT

## 2020-02-03 RX ORDER — PREDNISONE 10 MG/1
10 TABLET ORAL 2 TIMES DAILY
Qty: 10 TABLET | Refills: 0 | Status: SHIPPED | OUTPATIENT
Start: 2020-02-03 | End: 2020-02-08

## 2020-02-03 ASSESSMENT — ENCOUNTER SYMPTOMS
RHINORRHEA: 0
CHEST TIGHTNESS: 0
SHORTNESS OF BREATH: 1
SINUS PRESSURE: 0
COUGH: 1
SORE THROAT: 0

## 2020-02-03 NOTE — PROGRESS NOTES
Pt c/o 2 day cough. No fever, chills, body aches. No sore throat or ear pain. +/- sinus congestion. Has been having to use albuterol lately. +mild dyspnea. No chest pain. Usually asthma well controlled with advair. Reason for Visit:   Chief Complaint   Patient presents with    Cough     cough x2 days     Patient History     HPI       Past Medical History:   Diagnosis Date    Anaphylactic reaction     due to nut allergy    Asthma        Past Surgical History:   Procedure Laterality Date    FINGER SURGERY Right 01/23/2018    Right  Small Finger Flexor Digitorum Superficialis (FDS) and Flexor Digitorum Profundus (FDP) Tendon Repair       Allergies: Allergies   Allergen Reactions    Tree Nut [Macadamia Nut Oil] Anaphylaxis    Peanut-Containing Drug Products      anaphylaxis       Review of Systems     ROS: Please refer to HPI for anypertinent positive findings, as all other systems were reviewed as negative unless otherwise listed above. Review of Systems   Constitutional: Positive for fatigue. Negative for chills and fever. HENT: Positive for congestion. Negative for ear pain, postnasal drip, rhinorrhea, sinus pressure and sore throat. Respiratory: Positive for cough and shortness of breath. Negative for chest tightness. Musculoskeletal: Negative for myalgias. Neurological: Negative for dizziness and headaches. Hematological: Negative for adenopathy. Physical Exam     Vitals:    02/03/20 1028   BP: 117/80   Pulse: 83   Resp: 14   Temp: 98.1 °F (36.7 °C)   SpO2: 92%       Physical Exam  Constitutional:       Appearance: Normal appearance. He is well-developed. HENT:      Right Ear: Tympanic membrane and external ear normal.      Left Ear: Tympanic membrane and external ear normal.      Mouth/Throat:      Mouth: Mucous membranes are moist.      Pharynx: No oropharyngeal exudate or posterior oropharyngeal erythema.    Eyes: Conjunctiva/sclera: Conjunctivae normal.   Neck:      Musculoskeletal: Neck supple. Cardiovascular:      Rate and Rhythm: Normal rate and regular rhythm. Heart sounds: Normal heart sounds. Pulmonary:      Effort: Pulmonary effort is normal.      Breath sounds: Wheezing present. Lymphadenopathy:      Cervical: No cervical adenopathy. Skin:     General: Skin is warm and dry. Neurological:      Mental Status: He is alert and oriented to person, place, and time. Psychiatric:         Mood and Affect: Mood normal.         Behavior: Behavior normal.         Assessment:    1. Viral upper respiratory tract infection    2. Exacerbation of persistent asthma, unspecified asthma severity      Plan:    Plenty of fluids and rest.  Likely viral- he does not feel bad other than his asthma exacerbation- so I do not feel that antibiotics are warranted. He has albuterol to use as needed. I advised him to try guaifenesin and maybe a cough suppressant.   Prednisone e-scribed to [address] [place] [address]

## 2020-05-20 ENCOUNTER — VIRTUAL VISIT (OUTPATIENT)
Dept: PULMONOLOGY | Age: 25
End: 2020-05-20
Payer: COMMERCIAL

## 2020-05-20 PROCEDURE — 99213 OFFICE O/P EST LOW 20 MIN: CPT | Performed by: INTERNAL MEDICINE

## 2020-05-20 RX ORDER — MONTELUKAST SODIUM 10 MG/1
10 TABLET ORAL NIGHTLY
Qty: 90 TABLET | Refills: 1 | Status: SHIPPED | OUTPATIENT
Start: 2020-05-20

## 2020-05-20 NOTE — PROGRESS NOTES
THIS VISIT WAS COMPLETED VIRTUALLY USING DOXY. ME      Chief complaint  This is a 25y.o. year old male  who comes to see me with a chief complaint of   Chief Complaint   Patient presents with    Asthma       HPI  Here with a cc of asthma    He is well. On advair and singulair. Rarely having to use albuteorl. Still working at [employer] but wears a mask. Has not been sick. Less use of marijuana       Past Medical History:   Diagnosis Date    Anaphylactic reaction     due to nut allergy    Asthma        Past Surgical History:   Procedure Laterality Date    FINGER SURGERY Right 01/23/2018    Right  Small Finger Flexor Digitorum Superficialis (FDS) and Flexor Digitorum Profundus (FDP) Tendon Repair       Social History     Socioeconomic History    Marital status: Single     Spouse name: Not on file    Number of children: Not on file    Years of education: Not on file    Highest education level: Not on file   Occupational History    Not on file   Social Needs    Financial resource strain: Not on file    Food insecurity     Worry: Not on file     Inability: Not on file    Transportation needs     Medical: Not on file     Non-medical: Not on file   Tobacco Use    Smoking status: Never Smoker    Smokeless tobacco: Never Used   Substance and Sexual Activity    Alcohol use:  Yes     Alcohol/week: 14.0 standard drinks     Types: 14 Cans of beer per week     Comment: socially-weekends    Drug use: No    Sexual activity: Never   Lifestyle    Physical activity     Days per week: Not on file     Minutes per session: Not on file    Stress: Not on file   Relationships    Social connections     Talks on phone: Not on file     Gets together: Not on file     Attends Rastafari service: Not on file     Active member of club or organization: Not on file     Attends meetings of clubs or organizations: Not on file     Relationship status: Not on file    Intimate partner violence     Fear of current or ex partner: Not

## 2020-12-12 NOTE — PATIENT INSTRUCTIONS
Continue with advair twice a day    Singulair at night    Albuterol as needed    Flu shot     Follow up in 6 months Yes

## 2021-01-05 ENCOUNTER — OFFICE VISIT (OUTPATIENT)
Dept: PULMONOLOGY | Age: 26
End: 2021-01-05
Payer: COMMERCIAL

## 2021-01-05 VITALS
TEMPERATURE: 98.4 F | SYSTOLIC BLOOD PRESSURE: 128 MMHG | WEIGHT: 144.6 LBS | RESPIRATION RATE: 16 BRPM | HEIGHT: 72 IN | HEART RATE: 88 BPM | DIASTOLIC BLOOD PRESSURE: 60 MMHG | OXYGEN SATURATION: 98 % | BODY MASS INDEX: 19.59 KG/M2

## 2021-01-05 DIAGNOSIS — Z23 NEED FOR INFLUENZA VACCINATION: ICD-10-CM

## 2021-01-05 DIAGNOSIS — J45.40 MODERATE PERSISTENT ASTHMA WITHOUT COMPLICATION: Primary | ICD-10-CM

## 2021-01-05 DIAGNOSIS — R76.8 ELEVATED IGE LEVEL: ICD-10-CM

## 2021-01-05 PROCEDURE — 99214 OFFICE O/P EST MOD 30 MIN: CPT | Performed by: INTERNAL MEDICINE

## 2021-01-05 PROCEDURE — 90686 IIV4 VACC NO PRSV 0.5 ML IM: CPT | Performed by: INTERNAL MEDICINE

## 2021-01-05 PROCEDURE — 90471 IMMUNIZATION ADMIN: CPT | Performed by: INTERNAL MEDICINE

## 2021-01-05 ASSESSMENT — ASTHMA QUESTIONNAIRES
QUESTION_5 LAST FOUR WEEKS HOW WOULD YOU RATE YOUR ASTHMA CONTROL: 5
QUESTION_1 LAST FOUR WEEKS HOW MUCH OF THE TIME DID YOUR ASTHMA KEEP YOU FROM GETTING AS MUCH DONE AT WORK, SCHOOL OR AT HOME: 5

## 2021-01-05 NOTE — PROGRESS NOTES
Vaccine Information Sheet, \"Influenza - Inactivated\"  given to Tio Maldonado, or parent/legal guardian of  Tio Maldonado and verbalized understanding. Patient responses:    Have you ever had a reaction to a flu vaccine? No  Do you have any current illness? No  Have you ever had [place] Syndrome? No  Do you have a serious allergy to any of the follow: Neomycin, Polymyxin, Thimerosal, eggs or egg products? No    Flu vaccine given per order. Please see immunization tab. Risks and benefits explained. Current VIS given.

## 2021-01-05 NOTE — PROGRESS NOTES
organizations: Not on file     Relationship status: Not on file    Intimate partner violence     Fear of current or ex partner: Not on file     Emotionally abused: Not on file     Physically abused: Not on file     Forced sexual activity: Not on file   Other Topics Concern    Not on file   Social History Narrative    Not on file       Family History   Problem Relation Age of Onset    Heart Disease Father 48         Current Outpatient Medications:     fluticasone-salmeterol (ADVAIR) 500-50 MCG/DOSE diskus inhaler, INHALE ONE PUFF BY MOUTH EVERY 12 HOURS, Disp: 1 Inhaler, Rfl: 3    albuterol sulfate HFA (PROAIR HFA) 108 (90 Base) MCG/ACT inhaler, Inhale 2 puffs into the lungs every 4 hours as needed for Wheezing or Shortness of Breath, Disp: 1 Inhaler, Rfl: 11    montelukast (SINGULAIR) 10 MG tablet, Take 1 tablet by mouth nightly (Patient not taking: Reported on 1/5/2021), Disp: 90 tablet, Rfl: 1    EPINEPHrine (EPIPEN 2-ADIEL) 0.3 MG/0.3ML SOAJ injection, Inject 0.3 mLs into the muscle once for 1 dose Use as directed for allergic reaction, Disp: 0.3 mL, Rfl: 5    PHYSICAL EXAM:  GENERAL:  Well nourished, alert, appears stated age, no distress. Calm  HEENT:  No apparent conjunctival irritation, extra-ocular muscles seem intact  NECK:  No masses visibile, no torticollis   LYMPH:  NO visible masses  LUNGS:  Clear but slightly diminished   ABDOMEN:  No gross apparent distention  EXTREMITIES:  No visibile swelling, no clubbing   NEURO:  No localizing deficits, CN II-XII intact. No aphasia noted   SKIN:  no visible rashes on exposed skin  PSYCH:  no hallucinations, normal affect    Pulmonary Function Testing 2006 ()  Mild obstruction without bronchodilator response    Chest imaging from 7/2017 reviewed.   My interpretation is appears to have bilateral air space disease with calcified granuloma    Chest imaging reports from [facility] 4/2017  Slightly increased patchy airspace opacities most pronounced in the left middle/lower lung zones and slightly increased right upper lung zone opacity, concerning for multifocal pneumonia. Malposition of the enteric feeding tube with tip in the mid thoracic esophagus. Recommend further advancement. Subacute left seventh rib fracture, unchanged. Bronchoscopy at [facility] 4/2017  Eosinophilia was 57%    ECHO  None  Lab Results   Component Value Date    WBC 8.8 08/22/2019    HGB 15.6 08/22/2019    HCT 46.0 08/22/2019    MCV 97.3 08/22/2019     08/22/2019       No results found for: BNP    No results found for: CREATININE, BUN, NA, K, CL, CO2    1/2018  Total IgE Level:  6989  Significant allergies:  everything    Eosinophils 8/2019  500    I reviewed above labs and images      Assessment/Plan:  1. Moderate persistent asthma without complication  Seems to be controlled on wixela and albuterol. 2. Elevated IgE level  Worsens in summer months. Ok to [employer] for now (since he has been off of it for months and is doing well). Call if needs to resume it sooner. Likely to need it during the spring and summer     3.  Need for influenza vaccination  Flu shot today   - INFLUENZA, QUADV, 3 YRS AND OLDER, IM PF, PREFILL SYR OR SDV, 0.5ML (AFLURIA QUADV, PF)        Follow up in 6 months

## 2021-09-16 ENCOUNTER — OFFICE VISIT (OUTPATIENT)
Dept: PULMONOLOGY | Age: 26
End: 2021-09-16
Payer: COMMERCIAL

## 2021-09-16 VITALS
RESPIRATION RATE: 16 BRPM | OXYGEN SATURATION: 98 % | BODY MASS INDEX: 19.96 KG/M2 | WEIGHT: 147.4 LBS | SYSTOLIC BLOOD PRESSURE: 116 MMHG | TEMPERATURE: 97.7 F | HEART RATE: 74 BPM | HEIGHT: 72 IN | DIASTOLIC BLOOD PRESSURE: 60 MMHG

## 2021-09-16 DIAGNOSIS — J45.40 MODERATE PERSISTENT ASTHMA WITHOUT COMPLICATION: Primary | ICD-10-CM

## 2021-09-16 DIAGNOSIS — R76.8 ELEVATED IGE LEVEL: ICD-10-CM

## 2021-09-16 PROCEDURE — 99213 OFFICE O/P EST LOW 20 MIN: CPT | Performed by: INTERNAL MEDICINE

## 2021-09-16 RX ORDER — ALBUTEROL SULFATE 90 UG/1
2 AEROSOL, METERED RESPIRATORY (INHALATION) EVERY 4 HOURS PRN
Qty: 1 EACH | Refills: 5 | Status: SHIPPED | OUTPATIENT
Start: 2021-09-16

## 2021-09-16 ASSESSMENT — ASTHMA QUESTIONNAIRES
QUESTION_5 LAST FOUR WEEKS HOW WOULD YOU RATE YOUR ASTHMA CONTROL: 5
ACT_TOTALSCORE: 20
QUESTION_4 LAST FOUR WEEKS HOW OFTEN HAVE YOU USED YOUR RESCUE INHALER OR NEBULIZER MEDICATION (SUCH AS ALBUTEROL): 5
QUESTION_3 LAST FOUR WEEKS HOW OFTEN DID YOUR ASTHMA SYMPTOMS (WHEEZING, COUGHING, SHORTNESS OF BREATH, CHEST TIGHTNESS OR PAIN) WAKE YOU UP AT NIGHT OR EARLIER THAN USUAL IN THE MORNING: 5
QUESTION_1 LAST FOUR WEEKS HOW MUCH OF THE TIME DID YOUR ASTHMA KEEP YOU FROM GETTING AS MUCH DONE AT WORK, SCHOOL OR AT HOME: 5

## 2021-09-16 NOTE — PROGRESS NOTES
Chief complaint  This is a 32y.o. year old male  who comes to see me with a chief complaint of   Chief Complaint   Patient presents with    Asthma    Follow-up       HPI  Here with a cc of asthma    Doing fine on wixela alone. Rare use of albuterol. No longer on singulair. Has been fully vaccinated against COVID>  Doing well         Current Outpatient Medications:     fluticasone-salmeterol (WIXELA INHUB) 500-50 MCG/DOSE diskus inhaler, INHALE ONE PUFF BY MOUTH TWICE DAILY (DISCARD DISKUS ONE MONTH AFTER OPENING THE FOIL POUCH) RINSE MOUTH AFTER USE, Disp: 1 each, Rfl: 11    albuterol sulfate HFA (PROAIR HFA) 108 (90 Base) MCG/ACT inhaler, Inhale 2 puffs into the lungs every 4 hours as needed for Wheezing or Shortness of Breath, Disp: 1 each, Rfl: 5    albuterol sulfate HFA (PROAIR HFA) 108 (90 Base) MCG/ACT inhaler, Inhale 2 puffs into the lungs every 4 hours as needed for Wheezing or Shortness of Breath, Disp: 1 Inhaler, Rfl: 11    montelukast (SINGULAIR) 10 MG tablet, Take 1 tablet by mouth nightly (Patient not taking: Reported on 1/5/2021), Disp: 90 tablet, Rfl: 1    EPINEPHrine (EPIPEN 2-ADIEL) 0.3 MG/0.3ML SOAJ injection, Inject 0.3 mLs into the muscle once for 1 dose Use as directed for allergic reaction, Disp: 0.3 mL, Rfl: 5    PHYSICAL EXAM:  GENERAL:  Well nourished, alert, appears stated age, no distress. Calm  HEENT:  No apparent conjunctival irritation, extra-ocular muscles seem intact  NECK:  No masses visibile, no torticollis   LYMPH:  NO visible masses  LUNGS:  Clear bilaterally   ABDOMEN:  No gross apparent distention  EXTREMITIES:  No visibile swelling, no clubbing   NEURO:  No localizing deficits, CN II-XII intact. No aphasia noted   SKIN:  no visible rashes on exposed skin  PSYCH:  no hallucinations, normal affect    Pulmonary Function Testing 2006 ()  Mild obstruction without bronchodilator response    Chest imaging from 7/2017 reviewed.   My interpretation is appears to have bilateral air space disease with calcified granuloma    Chest imaging reports from [facility] 4/2017  Slightly increased patchy airspace opacities most pronounced in the left middle/lower lung zones and slightly increased right upper lung zone opacity, concerning for multifocal pneumonia. Malposition of the enteric feeding tube with tip in the mid thoracic esophagus. Recommend further advancement. Subacute left seventh rib fracture, unchanged. Bronchoscopy at [facility] 4/2017  Eosinophilia was 57%    1/2018  Total IgE Level:  6989  Significant allergies:  everything    I reviewed above labs and images      Assessment/Plan:  1. Moderate persistent asthma without complication  Controlled on wixela. Albuterol PRN  - fluticasone-salmeterol (WIXELA INHUB) 500-50 MCG/DOSE diskus inhaler; INHALE ONE PUFF BY MOUTH TWICE DAILY (DISCARD DISKUS ONE MONTH AFTER OPENING THE FOIL POUCH) RINSE MOUTH AFTER USE  Dispense: 1 each; Refill: 11  - albuterol sulfate HFA (PROAIR HFA) 108 (90 Base) MCG/ACT inhaler; Inhale 2 puffs into the lungs every 4 hours as needed for Wheezing or Shortness of Breath  Dispense: 1 each; Refill: 5    2.  Elevated IgE level  No longer on singulair and no adverse issues since stopping           Follow up in 6 months

## 2023-05-24 ENCOUNTER — OFFICE VISIT (OUTPATIENT)
Dept: PULMONOLOGY | Age: 28
End: 2023-05-24
Payer: COMMERCIAL

## 2023-05-24 VITALS
HEART RATE: 92 BPM | RESPIRATION RATE: 18 BRPM | OXYGEN SATURATION: 94 % | WEIGHT: 143 LBS | SYSTOLIC BLOOD PRESSURE: 110 MMHG | TEMPERATURE: 98 F | DIASTOLIC BLOOD PRESSURE: 70 MMHG | BODY MASS INDEX: 19.37 KG/M2 | HEIGHT: 72 IN

## 2023-05-24 DIAGNOSIS — D72.18 EOSINOPHILIA IN DISEASES CLASSIFIED ELSEWHERE: ICD-10-CM

## 2023-05-24 DIAGNOSIS — J45.40 MODERATE PERSISTENT ASTHMA WITHOUT COMPLICATION: Primary | ICD-10-CM

## 2023-05-24 DIAGNOSIS — R76.8 ELEVATED IGE LEVEL: ICD-10-CM

## 2023-05-24 PROCEDURE — 99212 OFFICE O/P EST SF 10 MIN: CPT | Performed by: INTERNAL MEDICINE

## 2023-05-24 RX ORDER — ALBUTEROL SULFATE 90 UG/1
2 AEROSOL, METERED RESPIRATORY (INHALATION) EVERY 4 HOURS PRN
Qty: 1 EACH | Refills: 11 | Status: SHIPPED | OUTPATIENT
Start: 2023-05-24

## 2023-05-24 RX ORDER — MONTELUKAST SODIUM 10 MG/1
10 TABLET ORAL DAILY
Qty: 30 TABLET | Refills: 11 | Status: SHIPPED | OUTPATIENT
Start: 2023-05-24

## 2023-05-24 RX ORDER — FLUTICASONE PROPIONATE AND SALMETEROL 500; 50 UG/1; UG/1
1 POWDER RESPIRATORY (INHALATION) EVERY 12 HOURS
Qty: 1 EACH | Refills: 11 | Status: SHIPPED | OUTPATIENT
Start: 2023-05-24

## 2023-05-24 ASSESSMENT — ASTHMA QUESTIONNAIRES
ACT_TOTALSCORE: 21
QUESTION_5 LAST FOUR WEEKS HOW WOULD YOU RATE YOUR ASTHMA CONTROL: 4
QUESTION_4 LAST FOUR WEEKS HOW OFTEN HAVE YOU USED YOUR RESCUE INHALER OR NEBULIZER MEDICATION (SUCH AS ALBUTEROL): 5
QUESTION_3 LAST FOUR WEEKS HOW OFTEN DID YOUR ASTHMA SYMPTOMS (WHEEZING, COUGHING, SHORTNESS OF BREATH, CHEST TIGHTNESS OR PAIN) WAKE YOU UP AT NIGHT OR EARLIER THAN USUAL IN THE MORNING: 3
QUESTION_2 LAST FOUR WEEKS HOW OFTEN HAVE YOU HAD SHORTNESS OF BREATH: 4
QUESTION_1 LAST FOUR WEEKS HOW MUCH OF THE TIME DID YOUR ASTHMA KEEP YOU FROM GETTING AS MUCH DONE AT WORK, SCHOOL OR AT HOME: 5

## 2023-05-24 NOTE — PROGRESS NOTES
Chief complaint  This is a 32y.o. year old male  who comes to see me with a chief complaint of   Chief Complaint   Patient presents with    Follow-up    Asthma       HPI  Here with a cc of asthma    Has not been here in nearly 2 years. Has been out of [employer] for some time. Only on albuterol. He let his insurance lapse and is currently without insurance. He does work. Has noticed some increased in SOB due to weather changes etc.         Current Outpatient Medications:     fluticasone-salmeterol (WIXELA INHUB) 500-50 MCG/ACT AEPB diskus inhaler, Inhale 1 puff into the lungs in the morning and 1 puff in the evening., Disp: 1 each, Rfl: 11    albuterol sulfate HFA (PROAIR HFA) 108 (90 Base) MCG/ACT inhaler, Inhale 2 puffs into the lungs every 4 hours as needed for Wheezing or Shortness of Breath, Disp: 1 each, Rfl: 11    montelukast (SINGULAIR) 10 MG tablet, Take 1 tablet by mouth daily, Disp: 30 tablet, Rfl: 11    fluticasone-salmeterol (WIXELA INHUB) 500-50 MCG/DOSE diskus inhaler, INHALE ONE PUFF BY MOUTH TWICE DAILY (DISCARD DISKUS ONE MONTH AFTER OPENING THE FOIL POUCH) RINSE MOUTH AFTER USE, Disp: 1 each, Rfl: 11    albuterol sulfate HFA (PROAIR HFA) 108 (90 Base) MCG/ACT inhaler, Inhale 2 puffs into the lungs every 4 hours as needed for Wheezing or Shortness of Breath, Disp: 1 each, Rfl: 5    albuterol sulfate HFA (PROAIR HFA) 108 (90 Base) MCG/ACT inhaler, Inhale 2 puffs into the lungs every 4 hours as needed for Wheezing or Shortness of Breath, Disp: 1 Inhaler, Rfl: 11    montelukast (SINGULAIR) 10 MG tablet, Take 1 tablet by mouth nightly (Patient not taking: Reported on 1/5/2021), Disp: 90 tablet, Rfl: 1    EPINEPHrine (EPIPEN 2-ADIEL) 0.3 MG/0.3ML SOAJ injection, Inject 0.3 mLs into the muscle once for 1 dose Use as directed for allergic reaction, Disp: 0.3 mL, Rfl: 5    PHYSICAL EXAM:  GENERAL:  Well nourished, alert, appears stated age, no distress.   Calm  HEENT:  No apparent conjunctival

## 2024-08-13 RX ORDER — FLUTICASONE PROPIONATE AND SALMETEROL 500; 50 UG/1; UG/1
POWDER RESPIRATORY (INHALATION)
Qty: 60 EACH | Refills: 10 | OUTPATIENT
Start: 2024-08-13

## 2024-08-13 NOTE — TELEPHONE ENCOUNTER
Last appt: 5/24/2023    Next appt: Visit date not found    Medication matches medication on Epic list

## 2024-08-15 ENCOUNTER — TELEPHONE (OUTPATIENT)
Dept: PULMONOLOGY | Age: 29
End: 2024-08-15

## 2024-08-15 DIAGNOSIS — J45.40 MODERATE PERSISTENT ASTHMA WITHOUT COMPLICATION: ICD-10-CM

## 2024-08-15 RX ORDER — ALBUTEROL SULFATE 90 UG/1
2 AEROSOL, METERED RESPIRATORY (INHALATION) EVERY 4 HOURS PRN
Qty: 1 EACH | Refills: 11 | Status: SHIPPED | OUTPATIENT
Start: 2024-08-15

## 2024-08-15 RX ORDER — FLUTICASONE PROPIONATE AND SALMETEROL 500; 50 UG/1; UG/1
1 POWDER RESPIRATORY (INHALATION) EVERY 12 HOURS
Qty: 1 EACH | Refills: 11 | Status: SHIPPED | OUTPATIENT
Start: 2024-08-15

## 2024-08-15 NOTE — TELEPHONE ENCOUNTER
Yazan is out of his medication and asks that we refill it. I scheduled him the next available appointment 10/7 at 920AM and advised him he does need to be seen every 6 months. Please send Omarsameer and his albuterol rescue inhaler into the pharmacy here at the hospital. Thank you.

## 2024-10-07 ENCOUNTER — OFFICE VISIT (OUTPATIENT)
Dept: PULMONOLOGY | Age: 29
End: 2024-10-07
Payer: COMMERCIAL

## 2024-10-07 VITALS
BODY MASS INDEX: 19.23 KG/M2 | TEMPERATURE: 97.8 F | DIASTOLIC BLOOD PRESSURE: 70 MMHG | OXYGEN SATURATION: 93 % | SYSTOLIC BLOOD PRESSURE: 128 MMHG | HEART RATE: 80 BPM | WEIGHT: 142 LBS | HEIGHT: 72 IN | RESPIRATION RATE: 18 BRPM

## 2024-10-07 DIAGNOSIS — D72.18 EOSINOPHILIA IN DISEASES CLASSIFIED ELSEWHERE: ICD-10-CM

## 2024-10-07 DIAGNOSIS — R76.8 ELEVATED IGE LEVEL: ICD-10-CM

## 2024-10-07 DIAGNOSIS — J45.40 MODERATE PERSISTENT ASTHMA WITHOUT COMPLICATION: Primary | ICD-10-CM

## 2024-10-07 PROCEDURE — 99214 OFFICE O/P EST MOD 30 MIN: CPT | Performed by: INTERNAL MEDICINE

## 2024-10-07 RX ORDER — FLUTICASONE PROPIONATE AND SALMETEROL 500; 50 UG/1; UG/1
1 POWDER RESPIRATORY (INHALATION) EVERY 12 HOURS
Qty: 3 EACH | Refills: 3 | Status: SHIPPED | OUTPATIENT
Start: 2024-10-07

## 2024-10-07 ASSESSMENT — ASTHMA QUESTIONNAIRES
QUESTION_1 LAST FOUR WEEKS HOW MUCH OF THE TIME DID YOUR ASTHMA KEEP YOU FROM GETTING AS MUCH DONE AT WORK, SCHOOL OR AT HOME: NONE OF THE TIME
QUESTION_5 LAST FOUR WEEKS HOW WOULD YOU RATE YOUR ASTHMA CONTROL: WELL CONTROLLED
ACT_TOTALSCORE: 20
QUESTION_4 LAST FOUR WEEKS HOW OFTEN HAVE YOU USED YOUR RESCUE INHALER OR NEBULIZER MEDICATION (SUCH AS ALBUTEROL): 2 OR 3 TIMES PER WEEK
QUESTION_3 LAST FOUR WEEKS HOW OFTEN DID YOUR ASTHMA SYMPTOMS (WHEEZING, COUGHING, SHORTNESS OF BREATH, CHEST TIGHTNESS OR PAIN) WAKE YOU UP AT NIGHT OR EARLIER THAN USUAL IN THE MORNING: ONCE OR TWICE
QUESTION_2 LAST FOUR WEEKS HOW OFTEN HAVE YOU HAD SHORTNESS OF BREATH: ONCE OR TWICE A WEEK

## 2024-10-07 NOTE — PROGRESS NOTES
Chief complaint  This is a 29 y.o. year old male  who comes to see me with a chief complaint of   Chief Complaint   Patient presents with    Follow-up    Asthma       HPI  Here with a cc of asthma    He said he has been more SOB at night which could be related to him being out of wixela for 2 weeks.  He said the script was sent to [facility] in the past but they said they would not fill it.  He was not sure what to do.  He remains on albuterol and singulair.          Current Outpatient Medications:     fluticasone-salmeterol (WIXELA INHUB) 500-50 MCG/ACT AEPB diskus inhaler, Inhale 1 puff into the lungs in the morning and 1 puff in the evening., Disp: 3 each, Rfl: 3    albuterol sulfate HFA (PROAIR HFA) 108 (90 Base) MCG/ACT inhaler, Inhale 2 puffs into the lungs every 4 hours as needed for Wheezing or Shortness of Breath, Disp: 1 each, Rfl: 11    fluticasone-salmeterol (WIXELA INHUB) 500-50 MCG/ACT AEPB diskus inhaler, Inhale 1 puff into the lungs in the morning and 1 puff in the evening., Disp: 1 each, Rfl: 11    montelukast (SINGULAIR) 10 MG tablet, Take 1 tablet by mouth daily, Disp: 30 tablet, Rfl: 11    fluticasone-salmeterol (WIXELA INHUB) 500-50 MCG/DOSE diskus inhaler, INHALE ONE PUFF BY MOUTH TWICE DAILY (DISCARD DISKUS ONE MONTH AFTER OPENING THE FOIL POUCH) RINSE MOUTH AFTER USE, Disp: 1 each, Rfl: 11    albuterol sulfate HFA (PROAIR HFA) 108 (90 Base) MCG/ACT inhaler, Inhale 2 puffs into the lungs every 4 hours as needed for Wheezing or Shortness of Breath, Disp: 1 each, Rfl: 5    montelukast (SINGULAIR) 10 MG tablet, Take 1 tablet by mouth nightly, Disp: 90 tablet, Rfl: 1    albuterol sulfate HFA (PROAIR HFA) 108 (90 Base) MCG/ACT inhaler, Inhale 2 puffs into the lungs every 4 hours as needed for Wheezing or Shortness of Breath, Disp: 1 Inhaler, Rfl: 11    EPINEPHrine (EPIPEN 2-ADIEL) 0.3 MG/0.3ML SOAJ injection, Inject 0.3 mLs into the muscle once for 1 dose Use as directed for allergic reaction, Disp:

## 2024-10-25 ENCOUNTER — HOSPITAL ENCOUNTER (EMERGENCY)
Age: 29
Discharge: HOME | End: 2024-10-25
Payer: COMMERCIAL

## 2024-10-25 VITALS
WEIGHT: 140.87 LBS | HEART RATE: 98 BPM | SYSTOLIC BLOOD PRESSURE: 121 MMHG | DIASTOLIC BLOOD PRESSURE: 79 MMHG | OXYGEN SATURATION: 95 % | RESPIRATION RATE: 18 BRPM | BODY MASS INDEX: 19.08 KG/M2 | TEMPERATURE: 98.1 F | HEIGHT: 72 IN

## 2024-10-25 DIAGNOSIS — J45.40 MODERATE PERSISTENT ASTHMA WITHOUT COMPLICATION: ICD-10-CM

## 2024-10-25 DIAGNOSIS — J45.41 MODERATE PERSISTENT ASTHMA WITH EXACERBATION: Primary | ICD-10-CM

## 2024-10-25 DIAGNOSIS — Z91.138 DRUG TREATMENT STOPPED DUE TO PATIENT RUNNING OUT OF MEDICATION: ICD-10-CM

## 2024-10-25 PROCEDURE — 94640 AIRWAY INHALATION TREATMENT: CPT

## 2024-10-25 PROCEDURE — 6360000002 HC RX W HCPCS: Performed by: PHYSICIAN ASSISTANT

## 2024-10-25 PROCEDURE — 99283 EMERGENCY DEPT VISIT LOW MDM: CPT

## 2024-10-25 PROCEDURE — 6370000000 HC RX 637 (ALT 250 FOR IP): Performed by: PHYSICIAN ASSISTANT

## 2024-10-25 PROCEDURE — 94761 N-INVAS EAR/PLS OXIMETRY MLT: CPT

## 2024-10-25 RX ORDER — IPRATROPIUM BROMIDE AND ALBUTEROL SULFATE 2.5; .5 MG/3ML; MG/3ML
1 SOLUTION RESPIRATORY (INHALATION) ONCE
Status: COMPLETED | OUTPATIENT
Start: 2024-10-25 | End: 2024-10-25

## 2024-10-25 RX ORDER — DEXAMETHASONE 4 MG/1
10 TABLET ORAL ONCE
Status: COMPLETED | OUTPATIENT
Start: 2024-10-25 | End: 2024-10-25

## 2024-10-25 RX ORDER — PREDNISONE 20 MG/1
40 TABLET ORAL
Qty: 12 TABLET | Refills: 0 | Status: SHIPPED | OUTPATIENT
Start: 2024-10-25 | End: 2024-10-31

## 2024-10-25 RX ORDER — IPRATROPIUM BROMIDE AND ALBUTEROL SULFATE 2.5; .5 MG/3ML; MG/3ML
2 SOLUTION RESPIRATORY (INHALATION) ONCE
Status: COMPLETED | OUTPATIENT
Start: 2024-10-25 | End: 2024-10-25

## 2024-10-25 RX ORDER — FLUTICASONE PROPIONATE AND SALMETEROL 500; 50 UG/1; UG/1
1 POWDER RESPIRATORY (INHALATION) EVERY 12 HOURS
Qty: 1 EACH | Refills: 1 | Status: SHIPPED | OUTPATIENT
Start: 2024-10-25

## 2024-10-25 RX ORDER — ALBUTEROL SULFATE 90 UG/1
2 INHALANT RESPIRATORY (INHALATION) 4 TIMES DAILY PRN
Qty: 18 G | Refills: 1 | Status: SHIPPED | OUTPATIENT
Start: 2024-10-25

## 2024-10-25 RX ADMIN — IPRATROPIUM BROMIDE AND ALBUTEROL SULFATE 2 DOSE: .5; 3 SOLUTION RESPIRATORY (INHALATION) at 08:25

## 2024-10-25 RX ADMIN — DEXAMETHASONE 10 MG: 4 TABLET ORAL at 08:25

## 2024-10-25 RX ADMIN — IPRATROPIUM BROMIDE AND ALBUTEROL SULFATE 1 DOSE: .5; 3 SOLUTION RESPIRATORY (INHALATION) at 07:35

## 2024-10-25 RX ADMIN — IPRATROPIUM BROMIDE AND ALBUTEROL SULFATE 2 DOSE: .5; 3 SOLUTION RESPIRATORY (INHALATION) at 07:18

## 2024-10-25 ASSESSMENT — PAIN - FUNCTIONAL ASSESSMENT: PAIN_FUNCTIONAL_ASSESSMENT: NONE - DENIES PAIN

## 2024-10-25 NOTE — ED PROVIDER NOTES
**ADVANCED PRACTICE PROVIDER, I HAVE EVALUATED THIS PATIENT**        [facility] EMERGENCY DEPARTMENT  EMERGENCY DEPARTMENT ENCOUNTER      Pt Name: Yazan Jones  MRN:[id number]  Birthdate 1995  Date of evaluation: 10/25/2024  Provider: Girish Platt PA-C  Note Started: 6:58 AM EDT 10/25/24        Chief Complaint:    Chief Complaint   Patient presents with    Asthma     Pt. Reports asthma exacerbation, pt. Reports home inhaler not helping.          Nursing Notes, Past Medical Hx, Past Surgical Hx, Social Hx, Allergies, and Family Hx were all reviewed and agreed with or any disagreements were addressed in the HPI.    HPI: (Location, Duration, Timing, Severity, Quality, Assoc Sx, Context, Modifying factors)    History From: Patient          Chief Complaint of short of breath worse yesterday and today and albuterol inhaler not helping    This is a  29 y.o. male who presents reporting that he really has not been on his controller breathing medication for his moderate asthma.  States that something with insurance or pharmacy changed and he has to go and pick it up from [facility] which has not been convenient for him.  The last couple of days or so he has been feeling wheezy tight and his shortness of breath is not made better with his albuterol to any great extent.  He is taken a couple of puffs from the albuterol inhaler this morning and still feels pretty tight wheezy and short of breath.  Denies feeling particularly sick or having any acute change in nasal secretions or postnasal drainage like with a cold or any particular cough.  No extremity changes fevers or other acute complaint.    PastMedical/Surgical History:      Diagnosis Date    Anaphylactic reaction     due to nut allergy    Asthma          Procedure Laterality Date    FINGER SURGERY Right 01/23/2018    Right  Small Finger Flexor Digitorum Superficialis (FDS) and Flexor Digitorum Profundus (FDP) Tendon Repair       Medications:  Previous  improved, respirations a bit easier, however patient still with expiratory wheezes worse on left and right on reassessment at bedside.  He has not received his 10mg Decadron ordered earlier and this is brought to nursing's attention so that they can get that medication.  We will also place for a couple more DuoNeb breathing treatments.   Now on reassessment after Decadron and a couple of breathing treatments, patient breathing much more easily.  Heart racing a bit however this is not from respiratory difficulty but just a side effect of the multiple albuterol treatments.  No acute respiratory distress at this time or need for further ER stabilization or inpatient hospitalization but rather conservative home care now discussed recommended and agreed upon with patient in a shared decision-making process.  We discharged home as follows.  Home in stable condition to begin medications as written and make sure to continue using your preventative discus twice daily as written. Follow-up outpatient with family doctor in about 3 to 5 days for recheck and further care and treatment. Also with pulmonology as needed. Return to the ER for any emergency worsening or concern.     The patient tolerated their visit well.  I evaluated the patient.  The physician was available for consultation as needed.  The patient and / or the family were informed of the results of any tests, a time was given to answer questions, a plan was proposed and they agreed with plan.     I am the Primary Clinician of Record.     CLINICAL IMPRESSION:  1. Moderate persistent asthma with exacerbation    2. Drug treatment stopped due to patient running out of medication    3. Moderate persistent asthma without complication        DISPOSITION Decision To Discharge 10/25/2024 08:59:51 AM           PATIENT REFERRED TO:  MADISON Chambers MD  [address] [facility] [address] [phone]      in 3-5 days for recheck and further care and

## 2024-10-25 NOTE — ED NOTES
D/C: Order noted for d/c. Pt confirmed d/c paperwork has correct name. Discharge and education instructions reviewed with patient. Teach-back successful.  Pt verbalized understanding and denied questions at this time. No acute distress noted. Patient instructed to follow-up as noted - return to emergency department if symptoms worsen. Patient verbalized understanding. Discharged per EDMD with discharge instructions. Pt discharged to private vehicle. Patient stable upon departure. Thanked patient for [facility] for care. Provider aware of patient pain at time of discharge.

## 2024-10-25 NOTE — DISCHARGE INSTRUCTIONS
Home in stable condition to begin medications as written and make sure to continue using your preventative discus twice daily as written.  Follow-up outpatient with family doctor in about 3 to 5 days for recheck and further care and treatment.  Also with pulmonology as needed.  Return to the ER for any emergency worsening or concern.

## 2025-08-26 DIAGNOSIS — J45.40 MODERATE PERSISTENT ASTHMA WITHOUT COMPLICATION: ICD-10-CM

## 2025-08-26 RX ORDER — FLUTICASONE PROPIONATE AND SALMETEROL 500; 50 UG/1; UG/1
1 POWDER RESPIRATORY (INHALATION) EVERY 12 HOURS
Qty: 1 EACH | Refills: 11 | Status: SHIPPED | OUTPATIENT
Start: 2025-08-26